# Patient Record
Sex: FEMALE
[De-identification: names, ages, dates, MRNs, and addresses within clinical notes are randomized per-mention and may not be internally consistent; named-entity substitution may affect disease eponyms.]

---

## 2023-04-19 ENCOUNTER — NURSE TRIAGE (OUTPATIENT)
Dept: OTHER | Facility: CLINIC | Age: 33
End: 2023-04-19

## 2023-04-19 NOTE — TELEPHONE ENCOUNTER
Location of patient: [facility]    Received call from [employer] at [facility] with Red Flag Complaint. Rajani Franco MRN: [id number]    Provider: Rafaela Christensen    Subjective: Caller states \"UTI\"     Current Symptoms:   UTI sxs - pain while urinating   Denies blood in urine   Pain in lower back/pelvis    Pain Severity:   Dysuria 3/10  Back pain 6/10    Temperature:    None     What has been tried:   None - called the other day for medication but told she needed an appt. Recommended disposition:   See PCP within 24 hours - Warm transfer to [facility] advice provided, patient verbalizes understanding; denies any other questions or concerns.     Outcome: see HCP within 24 hours     This triage is a result of [facility]    Reason for Disposition   Side (flank) or lower back pain present    Protocols used: Urinary Symptoms-ADULT-